# Patient Record
Sex: MALE | Race: WHITE | NOT HISPANIC OR LATINO | Employment: OTHER | URBAN - NONMETROPOLITAN AREA
[De-identification: names, ages, dates, MRNs, and addresses within clinical notes are randomized per-mention and may not be internally consistent; named-entity substitution may affect disease eponyms.]

---

## 2021-09-16 DIAGNOSIS — R07.9 CHEST PAIN, UNSPECIFIED TYPE: Primary | ICD-10-CM

## 2022-01-06 NOTE — H&P
Cardiology History and Physical Note        Patient Name: Regan Salas  Age/Sex: 66 y.o. male  : 1955  MRN: [id number]    Date of Admission : 2022    Primary care Physician: Franco Odell MD    Reason for Admission: Chest pain exercise Cardiolite stress test      Subjective:       Chief Complaint: Chest pain    History of Present Illness:  Regan Salas is a 66 y.o. male      with a height of 5 feet 10 inches weight of 194 pounds with a body mass index of 30 with a past medical history significant for atherosclerotic coronary artery disease, status post PTCA and stenting of the right coronary artery with deployment of a 3 mm x 16 mm Taxus stent done at [facility] with last coronary angiogram done in 2016 which had revealed sustained patency in the right coronary artery site of previous angioplasty and stenting with 40 to 50% stenosis in the first diagonal branch towards the ostium, luminal irregularity in the left anterior descending artery, 40 to 50% stenosis in the first obtuse marginal branch treated medically, baseline resting electrocardiogram with a right bundle branch block, arterial hypertension, hypertensive heart disease, concentric left ventricular hypertrophy with diastolic dysfunction, hyperlipidemia, nonrheumatic mild mitral and nonrheumatic mild tricuspid regurgitation and benign prostatic hypertrophy evaluation by Dr. Xiong.    Patient had recently been on a cruise to [place].  Patient presents for a follow-up evaluation.  Patient was evaluated by Dr. Xiong.  Patient complaining of having atypical symptoms of discomfort.  Patient is unable to describe if the discomfort is similar in quality as the one which she had experienced prior to the stent placement.    Patient has been active.  Patient on questioning denies any symptoms of palpitation.  Patient denies any lower extremity edema.  Patient denies any symptoms of lightheaded dizziness near  syncope.    Patient on further questioning denies any symptoms of easy bruising.    Patient resting heart rate is 71 bpm with a blood pressure 120/72  respiration of 18.  Patient resting electrocardiogram done revealed sinus rhythm with a right bundle branch block.    Patient 10 point review of system except for what stated in the history of present Xience is negative.    Concurrent  Medical History:  1.  Atypical chest pain.  2.  Baseline resting electrocardiogram with a right bundle branch block.  3.  Atherosclerotic coronary artery disease.  4.  Last coronary angiogram done in October 2016 had revealed:  A.  Sustained patency in the right coronary artery, site of previous angioplasty and stenting.  B.  First diagonal branch towards the ostium with 40% to 50% stenosis with evidence of good DELMI-3 flow.  C.  Luminal irregularity in the left anterior descending artery.  D.  First obtuse marginal branch with 40% to 50% stenosis.  5.   PTCA and stenting of the right coronary artery with deployment of a 3 mm x 16 mm Taxus stent done in July 2007.  6.   Arterial hypertension.  7.   Hypertensive heart disease with preserved left ventricular systolic function with an ejection fraction of 55% with diastolic dysfunction.  8.  Nonrheumatic mild mitral and nonrheumatic mild tricuspid regurgitation.  9.  Hyperlipidemia.  10.  Benign prostatic hypertrophy with evaluation by Dr. Xiong              Past Surgical History:  1.  Coronary angiogram.  2.  Appendectomy.  3.  Cholecystectomy    SOCIAL HISTORY:  Denies any tobacco or alcohol intake. Used to be a  for the high school. Currently retired, works part time.     FAMILY HISTORY:  Significant for father who had coronary artery bypass grafting and brother who had stents.     CARDIAC RISK FACTORS:  1.   Male.  2.   Arterial hypertension.  3.   Hyperlipidemia.  4.   Family history for coronary artery disease.        Allergies:  Not on File    Home  Medication::  Prior to Admission medications    Not on File   1.  Aspirin 81 mg once a day.  2.  Plavix 75 mg once a day.  3.  Lisinopril 10 mg daily.  4.  Pravastatin 40 mg at bedtime      Review of Systems   Constitutional: Positive for fatigue. Negative for chills, fever and unexpected weight change.   HENT: Negative for hearing loss and nosebleeds.    Eyes: Negative for visual disturbance.   Respiratory: Positive for chest tightness. Negative for cough, shortness of breath and wheezing.    Cardiovascular: Negative for chest pain, palpitations and leg swelling.   Gastrointestinal: Negative for abdominal pain, blood in stool, constipation, diarrhea, nausea and vomiting.   Endocrine: Negative for cold intolerance, heat intolerance, polydipsia, polyphagia and polyuria.   Genitourinary: Negative for hematuria.   Musculoskeletal: Negative for joint swelling, myalgias and neck pain.   Skin: Negative for color change, rash and wound.   Neurological: Negative for dizziness, seizures, syncope, light-headedness, numbness and headaches.   Hematological: Does not bruise/bleed easily.         Objective:     There were no vitals taken for this visit.  Blood pressure 120/72 pulse of 81 respiration of 18 oxygen saturation of 97%  Constitutional:       Appearance: Well-developed.   Eyes:      General: No scleral icterus.     Conjunctiva/sclera: Conjunctivae normal.      Pupils: Pupils are equal, round, and reactive to light.   HENT:      Head: Normocephalic and atraumatic.      Left Ear: External ear normal.      Nose: Nose normal.   Neck:      Thyroid: No thyromegaly.      Vascular: No JVD.      Trachea: No tracheal deviation.      Lymphadenopathy: No cervical adenopathy.   Pulmonary:      Breath sounds: Normal breath sounds. No stridor.   Cardiovascular:      Normal rate. Regular rhythm.   Abdominal:      General: Bowel sounds are normal.   Musculoskeletal: Normal range of motion.      Cervical back: Normal range of motion and  neck supple. Skin:     General: Skin is warm and dry.   Neurological:      Mental Status: Alert and oriented to person, place, and time.      Deep Tendon Reflexes: Reflexes are normal and symmetric.   Psychiatric:         Behavior: Behavior normal.         Thought Content: Thought content normal.         Judgment: Judgment normal.         Lab Review:           Invalid input(s): LABALBU, PROT                                    EKG:   ECG/EMG Results (last 24 hours)     ** No results found for the last 24 hours. **          Imaging:  Imaging Results (Last 24 Hours)     ** No results found for the last 24 hours. **          I personally viewed and interpreted the patient's EKG/Telemetry data.    Assessment:   1.  Atypical chest pain.  2.  Atherosclerotic coronary artery disease.  3.  PTCA and stenting of the right coronary artery done in 2007 at [facility].  4.  Arterial hypertension.  5.  Hypertensive heart disease.  6.  Concentric left ventricular hypertrophy with diastolic dysfunction with an ejection fraction of 55%.  7.  Nonrheumatic mitral and tricuspid regurgitation.  8.  Hyperlipidemia.          Plan:   1.  Atherosclerotic coronary artery disease.  Patient has history of documented coronary artery disease with PTCA and stenting of the right coronary artery done in 2007 at [facility] with deployment of a 3 mm x 16 mm Taxus drug-eluting stent.  Patient last coronary angiogram done in 2016 had revealed sustained patency in the right coronary artery with 40 to 50% stenosis in the diagonal branch towards the ostium and 40 to 50% stenosis in the first obtuse marginal branch.  Patient has been active.  Patient has symptoms of atypical chest pain.  Patient resting electrocardiogram done reveals sinus rhythm with a right bundle branch block.  Patient at the present time has been recommended to undergo exercise Cardiolite stress test.  Risk-benefit treatment option further exercise Cardiolite stress  test were discussed with the patient and an informed consent was obtained.  Risk-benefit treatment options for the exercise Cardiolite stress test were discussed with the patient and an informed consent was obtained.      Myocardial perfusion scintigraphy (MPS)  was recommended to the patient as the best non-invasive modality for the assessment of obstructive CAD.  I  did spend some time discussing the procedure, as well as risks and benefits.  I also informed the patient that the risk of nonfatal MI or major cardiac complication is about  0.02%. The patient was also informed about the risks and benefits of MPS. Patient was also provided with a handout describing the test, as well as patient instructions prior to testing.      I also discussed the results of MPS as divided into risks.The NPV of this test is as high as 98%.  I also specifically discussed the risk of cardiac death with the following percentages:    Low-risk MPS:                          0.5% per year  Mildly abnormal MPS:              2.7%  Moderately abnormal:             2.9%  Severely abnormal:                 4.2%    2.  Abnormal resting electrocardiogram.  Patient baseline resting electrocardiogram reveals sinus rhythm with a right bundle branch block.  Patient had not complained of having any symptoms of lightheaded dizziness near syncope.    3.  Arterial hypertension.  Patient blood pressure 120/72.  Patient has been counseled to decrease her salt intake and to continue with the present dose of the lisinopril 10 mg daily.    4.  Hypertensive heart disease with concentric left ventricular hypertrophy with diastolic dysfunction with nonrheumatic mitral and tricuspid regurgitation.  Clinically at the present time patient is euvolemic and not in congestive heart failure.    5.  Hyperlipidemia.  Patient has been counseled on low-fat low-cholesterol diet and to continue with the present dose of the pravastatin.    6.  Benign prostatic hypertrophy.   Patient has had evaluation by Dr. Xiong.  Patient had not complained of having any urinary disturbances.    7.  Dual antiplatelets with aspirin and Plavix.  Patient had not complained of having any episodes of any bruising or any bleeding diastasis.    The above plan of management were discussed with the patient.      Time: time spent in face-to-face evaluation of greater than 55 minutes and interacting and formulating examining and discussing the plan with the patient with 50% of greater time spent in face-to-face interaction.    Electronically signed by Demond Scott MD, 01/06/22, 7:20 AM CST.    Dictated utilizing Dragon dictation.

## 2022-01-07 ENCOUNTER — APPOINTMENT (OUTPATIENT)
Dept: NUCLEAR MEDICINE | Facility: HOSPITAL | Age: 67
End: 2022-01-07

## 2022-01-07 ENCOUNTER — HOSPITAL ENCOUNTER (OUTPATIENT)
Dept: CARDIOLOGY | Facility: HOSPITAL | Age: 67
Discharge: HOME | End: 2022-01-07

## 2022-01-21 RX ORDER — PRAVASTATIN SODIUM 40 MG
40 TABLET ORAL NIGHTLY
COMMUNITY

## 2022-01-21 RX ORDER — ASPIRIN 81 MG/1
81 TABLET, CHEWABLE ORAL DAILY
COMMUNITY

## 2022-01-21 RX ORDER — CLOPIDOGREL BISULFATE 75 MG/1
75 TABLET ORAL DAILY
COMMUNITY

## 2022-01-21 RX ORDER — LISINOPRIL 10 MG/1
10 TABLET ORAL DAILY
COMMUNITY

## 2022-01-24 ENCOUNTER — TELEPHONE (OUTPATIENT)
Dept: GENERAL RADIOLOGY | Facility: HOSPITAL | Age: 67
End: 2022-01-24

## 2022-01-24 ENCOUNTER — APPOINTMENT (OUTPATIENT)
Dept: NUCLEAR MEDICINE | Facility: HOSPITAL | Age: 67
End: 2022-01-24

## 2022-01-24 ENCOUNTER — HOSPITAL ENCOUNTER (OUTPATIENT)
Dept: CARDIOLOGY | Facility: HOSPITAL | Age: 67
Discharge: HOME | End: 2022-01-24

## 2022-02-04 ENCOUNTER — APPOINTMENT (OUTPATIENT)
Dept: CARDIOLOGY | Facility: HOSPITAL | Age: 67
End: 2022-02-04

## 2022-02-04 ENCOUNTER — APPOINTMENT (OUTPATIENT)
Dept: NUCLEAR MEDICINE | Facility: HOSPITAL | Age: 67
End: 2022-02-04

## 2022-02-09 ENCOUNTER — HOSPITAL ENCOUNTER (OUTPATIENT)
Dept: NUCLEAR MEDICINE | Facility: HOSPITAL | Age: 67
Discharge: HOME | End: 2022-02-09

## 2022-02-09 ENCOUNTER — HOSPITAL ENCOUNTER (OUTPATIENT)
Dept: CARDIOLOGY | Facility: HOSPITAL | Age: 67
Discharge: HOME | End: 2022-02-09

## 2022-02-09 DIAGNOSIS — R07.9 CHEST PAIN, UNSPECIFIED TYPE: ICD-10-CM

## 2022-02-09 DIAGNOSIS — Z98.61 HISTORY OF PTCA: ICD-10-CM

## 2022-02-09 DIAGNOSIS — I25.10 ATHEROSCLEROSIS OF NATIVE CORONARY ARTERY OF NATIVE HEART, UNSPECIFIED WHETHER ANGINA PRESENT: ICD-10-CM

## 2022-02-09 PROCEDURE — 78452 HT MUSCLE IMAGE SPECT MULT: CPT

## 2022-02-09 PROCEDURE — A9500 TC99M SESTAMIBI: HCPCS | Performed by: INTERNAL MEDICINE

## 2022-02-09 PROCEDURE — 0 TECHNETIUM SESTAMIBI: Performed by: INTERNAL MEDICINE

## 2022-02-09 PROCEDURE — 93017 CV STRESS TEST TRACING ONLY: CPT

## 2022-02-09 RX ADMIN — TECHNETIUM TC 99M SESTAMIBI 1 DOSE: 1 INJECTION INTRAVENOUS at 09:45

## 2022-02-09 RX ADMIN — TECHNETIUM TC 99M SESTAMIBI 1 DOSE: 1 INJECTION INTRAVENOUS at 08:09

## 2022-02-19 LAB
BH CV REST NUCLEAR ISOTOPE DOSE: 10.9 MCI
BH CV STRESS BP STAGE 1: NORMAL
BH CV STRESS BP STAGE 2: NORMAL
BH CV STRESS DURATION MIN STAGE 1: 3
BH CV STRESS DURATION MIN STAGE 2: 1
BH CV STRESS DURATION SEC STAGE 1: 0
BH CV STRESS DURATION SEC STAGE 2: 38
BH CV STRESS GRADE STAGE 1: 10
BH CV STRESS GRADE STAGE 2: 12
BH CV STRESS HR STAGE 1: 121
BH CV STRESS HR STAGE 2: 134
BH CV STRESS METS STAGE 1: 5
BH CV STRESS METS STAGE 2: 7.5
BH CV STRESS NUCLEAR ISOTOPE DOSE: 35.6 MCI
BH CV STRESS PROTOCOL 1: NORMAL
BH CV STRESS RECOVERY BP: NORMAL MMHG
BH CV STRESS RECOVERY HR: 84 BPM
BH CV STRESS SPEED STAGE 1: 1.7
BH CV STRESS SPEED STAGE 2: 2.5
BH CV STRESS STAGE 1: 1
BH CV STRESS STAGE 2: 2
LV EF NUC BP: 80 %
MAXIMAL PREDICTED HEART RATE: 153 BPM
PERCENT MAX PREDICTED HR: 87.58 %
STRESS BASELINE BP: NORMAL MMHG
STRESS BASELINE HR: 73 BPM
STRESS PERCENT HR: 103 %
STRESS POST ESTIMATED WORKLOAD: 7 METS
STRESS POST EXERCISE DUR MIN: 4 MIN
STRESS POST EXERCISE DUR SEC: 38 SEC
STRESS POST PEAK BP: NORMAL MMHG
STRESS POST PEAK HR: 134 BPM
STRESS TARGET HR: 130 BPM

## 2023-05-04 ENCOUNTER — PREP FOR SURGERY (OUTPATIENT)
Dept: OTHER | Facility: HOSPITAL | Age: 68
End: 2023-05-04
Payer: MEDICARE

## 2023-05-04 DIAGNOSIS — I44.1 AV BLOCK, 2ND DEGREE: ICD-10-CM

## 2023-05-04 DIAGNOSIS — I25.10 ATHEROSCLEROSIS OF NATIVE CORONARY ARTERY OF NATIVE HEART, UNSPECIFIED WHETHER ANGINA PRESENT: Primary | ICD-10-CM

## 2023-05-04 DIAGNOSIS — Z98.61 HISTORY OF PTCA: ICD-10-CM

## 2023-05-04 DIAGNOSIS — R94.31 ABNORMAL EKG: ICD-10-CM

## 2023-05-04 RX ORDER — SODIUM CHLORIDE 9 MG/ML
40 INJECTION, SOLUTION INTRAVENOUS AS NEEDED
Status: CANCELLED | OUTPATIENT
Start: 2023-05-05

## 2023-05-04 RX ORDER — SODIUM CHLORIDE 0.9 % (FLUSH) 0.9 %
10 SYRINGE (ML) INJECTION AS NEEDED
Status: CANCELLED | OUTPATIENT
Start: 2023-05-05

## 2023-05-04 RX ORDER — SODIUM CHLORIDE 0.9 % (FLUSH) 0.9 %
3 SYRINGE (ML) INJECTION EVERY 12 HOURS SCHEDULED
Status: CANCELLED | OUTPATIENT
Start: 2023-05-05

## 2023-05-04 RX ORDER — SODIUM CHLORIDE 9 MG/ML
75 INJECTION, SOLUTION INTRAVENOUS CONTINUOUS
Status: CANCELLED | OUTPATIENT
Start: 2023-05-05

## 2023-05-04 NOTE — H&P
Cardiology History and Physical Note        Patient Name: Regan Salas  Age/Sex: 67 y.o. male  : 1955  MRN: [id number]    Date of Admission : 2023    Primary care Physician: Demond Scott MD    Reason for Admission: History of atherosclerotic coronary artery disease with previous PTCA and stenting of the right coronary artery chest pain 2-1 AV block      Subjective:       Chief Complaint: Dizziness.    History of Present Illness:  Regan Salas is a 67 y.o. male      with a height of 5 feet 10 inches weight of 194 pounds with a body mass index of 30 with a past medical history significant for atherosclerotic coronary artery disease, status post PTCA and stenting of the right coronary artery with deployment of a 3 mm x 16 mm Taxus stent done at [facility] with last coronary angiogram done in 2016 which had revealed sustained patency in the right coronary artery site of previous angioplasty and stenting with 40 to 50% stenosis in the first diagonal branch towards the ostium, luminal irregularity in the left anterior descending artery, 40 to 50% stenosis in the first obtuse marginal branch treated medically, negative myocardial perfusion Lexiscan Cardiolite stress test done in 2022, resting EKG with a right bundle branch block with 2-1 block, arterial hypertension, hypertensive heart disease, concentric left ventricular hypertrophy with diastolic dysfunction, hyperlipidemia, nonrheumatic mild mitral and nonrheumatic mild tricuspid regurgitation and benign prostatic hypertrophy evaluation by Dr. Xiong.     Patient had been on a cruise to [place].  Patient presents for a follow-up evaluation.  Patient was evaluated by Dr. Xiong.  Patient complaining of having atypical symptoms of discomfort.  Patient is unable to describe if the discomfort is similar in quality as the one which she had experienced prior to the stent placement.     Patient has been active. Patient on  questioning denies any symptoms of palpitation. Patient denies any lower extremity edema.  Patient denies any symptoms of lightheaded dizziness near syncope.     Patient on further questioning denies any symptoms of easy bruising.     Patient resting EKG is sinus bradycardia with 2-1 block with right bundle branch block rate of 39 bpm with a blood pressure 120/72  respiration of 18.      Patient 10 point review of system except for what stated in the history of present Xience is negative.     Concurrent  Medical History:  1.  Atypical chest pain.  2.    Sinus bradycardia with 2-1 block with a right bundle branch block..  3.  Atherosclerotic coronary artery disease.  4.  Last coronary angiogram done in October 2016 had revealed:  A.  Sustained patency in the right coronary artery, site of previous angioplasty and stenting.  B.  First diagonal branch towards the ostium with 40% to 50% stenosis with evidence of good DELMI-3 flow.  C.  Luminal irregularity in the left anterior descending artery.  D.  First obtuse marginal branch with 40% to 50% stenosis.  5.   PTCA and stenting of the right coronary artery with deployment of a 3 mm x 16 mm Taxus stent done in July 2007.  6.   Arterial hypertension.  7.   Hypertensive heart disease with preserved left ventricular systolic function with an ejection fraction of 55% with diastolic dysfunction.  8.  Nonrheumatic mild mitral and nonrheumatic mild tricuspid regurgitation.  9.  Hyperlipidemia.  10.  Benign prostatic hypertrophy with evaluation by Dr. Xiong                    Past Surgical History:  1.  Coronary angiogram.  2.  Appendectomy.  3.  Cholecystectomy     SOCIAL HISTORY:  Denies any tobacco or alcohol intake. Used to be a  for the high school. Currently retired, works part time.     FAMILY HISTORY:  Significant for father who had coronary artery bypass grafting and brother who had stents.     CARDIAC RISK FACTORS:  1.   Male.  2.   Arterial  hypertension.  3.   Hyperlipidemia.  4.   Family history for coronary artery disease.     Allergies:  No Known Allergies    Home Medication::  Prior to Admission medications    Medication Sig Start Date End Date Taking? Authorizing Provider   aspirin 81 MG chewable tablet Chew 81 mg Daily.    Howard Russo MD   clopidogrel (PLAVIX) 75 MG tablet Take 75 mg by mouth Daily.    Howard Russo MD   lisinopril (PRINIVIL,ZESTRIL) 10 MG tablet Take 10 mg by mouth Daily.    Howard Russo MD   pravastatin (PRAVACHOL) 40 MG tablet Take 40 mg by mouth Every Night.    Howard Russo MD         Review of Systems   Constitutional: Positive for fatigue. Negative for chills, fever and unexpected weight change.   HENT: Negative for hearing loss and nosebleeds.    Eyes: Negative for visual disturbance.   Respiratory: Positive for chest tightness. Negative for cough, shortness of breath and wheezing.    Cardiovascular: Negative for chest pain, palpitations and leg swelling.   Gastrointestinal: Negative for abdominal pain, blood in stool, constipation, diarrhea, nausea and vomiting.   Endocrine: Negative for cold intolerance, heat intolerance, polydipsia, polyphagia and polyuria.   Genitourinary: Negative for hematuria.   Musculoskeletal: Negative for joint swelling, myalgias and neck pain.   Skin: Negative for color change, rash and wound.   Neurological: Positive for light-headedness. Negative for dizziness, seizures, syncope, numbness and headaches.   Hematological: Does not bruise/bleed easily.         Objective:     There were no vitals taken for this visit.    Physical Exam    Lab Review:           Invalid input(s): LABALBU, PROT                                    EKG:   ECG/EMG Results (last 24 hours)     ** No results found for the last 24 hours. **          Imaging:  Imaging Results (Last 24 Hours)     ** No results found for the last 24 hours. **          I personally viewed and interpreted the  patient's EKG/Telemetry data.    Assessment:       1.  Atypical chest pain abnormal EKG with sinus bradycardia right bundle branch block and 2 : 1 AV block with a heart rate of 39 bpm  2.  Atherosclerotic coronary artery disease.  3.  PTCA and stenting of the right coronary artery done in 2007 at [facility].  4.  Arterial hypertension.  5.  Hypertensive heart disease.  6.  Concentric left ventricular hypertrophy with diastolic dysfunction with an ejection fraction of 55%.  7.  Nonrheumatic mitral and tricuspid regurgitation.  8.  Hyperlipidemia.      Plan:   1.  Atherosclerotic coronary artery disease.  Patient has history of documented coronary artery disease with PTCA and stenting of the right coronary artery done in 2007 at [facility] with deployment of a 3 mm x 16 mm Taxus drug-eluting stent.  Patient last coronary angiogram done in 2016 had revealed sustained patency in the right coronary artery with 40 to 50% stenosis in the diagonal branch towards the ostium and 40 to 50% stenosis in the first obtuse marginal branch.  Patient has been active.  Patient has symptoms of atypical chest pain.  Patient resting electrocardiogram done revealed sinus bradycardia with 2-1 block.  Patient has been recommended dual-chamber pacemaker implantation.  Due to the patient symptoms of chest pain with previous right coronary artery stent patient has been recommended coronary angiogram prior to pacemaker implantation.  Risk-benefit treatment option for a permanent pacemaker implantation was discussed with the patient also.  Patient has documented history of atherosclerotic coronary artery disease with symptoms of substernal chest pain suggestive of crescendo unstable angina pectoris.  Patient has been explained risk-benefit treatment options for a coronary angiogram and an informed consent was obtained from the patient for the coronary angiogram.  Patient's Mallampati score was II, patient's ASA classification  was II.  Patient will undergo IV hydration and will check the renal function prior to the coronary angiogram.      2.  Abnormal resting electrocardiogram.  Patient resting electrocardiogram reveals sinus bradycardia heart rate of 39 with 2-1 block with right bundle branch block.  Patient had complained of having any symptoms of lightheaded.  Patient complains of feeling tired and fatigued.  Patient has been recommended after the coronary angiogram dual-chamber pacemaker implantation unless the patient has significant stenosis in the right coronary artery which could be contributing to the patient 2-1 block.  Patient is currently not on any AV blocking medication.    3.  Arterial hypertension.  Patient blood pressure 120/72.  Patient has been counseled to decrease her salt intake and to continue with the present dose of the lisinopril 10 mg daily.     4.  Hypertensive heart disease with concentric left ventricular hypertrophy with diastolic dysfunction with nonrheumatic mitral and tricuspid regurgitation.  Clinically at the present time patient is euvolemic and not in congestive heart failure.  Patient has been recommended to undergo a transthoracic echocardiogram to evaluate the left ventricular systolic function.     5.  Hyperlipidemia.  Patient has been counseled on low-fat low-cholesterol diet and to continue with the present dose of the pravastatin.     6.  Benign prostatic hypertrophy.  Patient has had evaluation by Dr. Xiong.  Patient had not complained of having any urinary disturbances.     7.  Dual antiplatelets with aspirin and Plavix.  Patient had not complained of having any episodes of any bruising or any bleeding diastasis.  Patient Plavix will be held in anticipation for pacemaker implantation.     The above plan of management were discussed with the patient.          Time: time spent in face-to-face evaluation of greater than 55  minutes and interacting and formulating examining and discussing the  plan with the patient with 50% of greater time spent in face-to-face interaction.    Electronically signed by Demond Scott MD, 05/04/23, 10:38 AM CDT.      Dictated utilizing Dragon dictation.

## 2023-05-05 ENCOUNTER — PREP FOR SURGERY (OUTPATIENT)
Dept: OTHER | Facility: HOSPITAL | Age: 68
End: 2023-05-05
Payer: MEDICARE

## 2023-05-05 ENCOUNTER — HOSPITAL ENCOUNTER (OUTPATIENT)
Facility: HOSPITAL | Age: 68
Setting detail: HOSPITAL OUTPATIENT SURGERY
Discharge: HOME | End: 2023-05-05
Attending: INTERNAL MEDICINE | Admitting: INTERNAL MEDICINE
Payer: MEDICARE

## 2023-05-05 VITALS
HEART RATE: 57 BPM | RESPIRATION RATE: 20 BRPM | HEIGHT: 70 IN | OXYGEN SATURATION: 95 % | BODY MASS INDEX: 28.53 KG/M2 | DIASTOLIC BLOOD PRESSURE: 72 MMHG | WEIGHT: 199.3 LBS | SYSTOLIC BLOOD PRESSURE: 149 MMHG | TEMPERATURE: 97.8 F

## 2023-05-05 DIAGNOSIS — Z98.61 HISTORY OF PTCA: ICD-10-CM

## 2023-05-05 DIAGNOSIS — I49.5 SSS (SICK SINUS SYNDROME): Primary | ICD-10-CM

## 2023-05-05 DIAGNOSIS — I25.10 ATHEROSCLEROSIS OF NATIVE CORONARY ARTERY OF NATIVE HEART, UNSPECIFIED WHETHER ANGINA PRESENT: ICD-10-CM

## 2023-05-05 DIAGNOSIS — I44.1 AV BLOCK, 2ND DEGREE: ICD-10-CM

## 2023-05-05 DIAGNOSIS — R94.31 ABNORMAL EKG: ICD-10-CM

## 2023-05-05 LAB
ANION GAP SERPL CALCULATED.3IONS-SCNC: 7 MMOL/L (ref 5–15)
BASOPHILS # BLD AUTO: 0.07 10*3/MM3 (ref 0–0.2)
BASOPHILS NFR BLD AUTO: 0.9 % (ref 0–1.5)
BUN SERPL-MCNC: 14 MG/DL (ref 8–23)
BUN/CREAT SERPL: 12.8 (ref 7–25)
CALCIUM SPEC-SCNC: 8.8 MG/DL (ref 8.6–10.5)
CHLORIDE SERPL-SCNC: 106 MMOL/L (ref 98–107)
CO2 SERPL-SCNC: 28 MMOL/L (ref 22–29)
CREAT SERPL-MCNC: 1.09 MG/DL (ref 0.76–1.27)
DEPRECATED RDW RBC AUTO: 42.9 FL (ref 37–54)
EGFRCR SERPLBLD CKD-EPI 2021: 73.9 ML/MIN/1.73
EOSINOPHIL # BLD AUTO: 0.12 10*3/MM3 (ref 0–0.4)
EOSINOPHIL NFR BLD AUTO: 1.5 % (ref 0.3–6.2)
ERYTHROCYTE [DISTWIDTH] IN BLOOD BY AUTOMATED COUNT: 12.1 % (ref 12.3–15.4)
GLUCOSE SERPL-MCNC: 95 MG/DL (ref 65–99)
HCT VFR BLD AUTO: 43.3 % (ref 37.5–51)
HGB BLD-MCNC: 14.7 G/DL (ref 13–17.7)
IMM GRANULOCYTES # BLD AUTO: 0.09 10*3/MM3 (ref 0–0.05)
IMM GRANULOCYTES NFR BLD AUTO: 1.1 % (ref 0–0.5)
INR PPP: 0.99 (ref 0.8–1.2)
LYMPHOCYTES # BLD AUTO: 1.91 10*3/MM3 (ref 0.7–3.1)
LYMPHOCYTES NFR BLD AUTO: 23.6 % (ref 19.6–45.3)
MCH RBC QN AUTO: 32.4 PG (ref 26.6–33)
MCHC RBC AUTO-ENTMCNC: 33.9 G/DL (ref 31.5–35.7)
MCV RBC AUTO: 95.4 FL (ref 79–97)
MONOCYTES # BLD AUTO: 0.61 10*3/MM3 (ref 0.1–0.9)
MONOCYTES NFR BLD AUTO: 7.5 % (ref 5–12)
NEUTROPHILS NFR BLD AUTO: 5.3 10*3/MM3 (ref 1.7–7)
NEUTROPHILS NFR BLD AUTO: 65.4 % (ref 42.7–76)
NRBC BLD AUTO-RTO: 0 /100 WBC (ref 0–0.2)
PLATELET # BLD AUTO: 214 10*3/MM3 (ref 140–450)
PMV BLD AUTO: 10.8 FL (ref 6–12)
POTASSIUM SERPL-SCNC: 4.1 MMOL/L (ref 3.5–5.2)
PROTHROMBIN TIME: 13 SECONDS (ref 11.1–15.3)
RBC # BLD AUTO: 4.54 10*6/MM3 (ref 4.14–5.8)
SODIUM SERPL-SCNC: 141 MMOL/L (ref 136–145)
WBC NRBC COR # BLD: 8.1 10*3/MM3 (ref 3.4–10.8)
WHOLE BLOOD HOLD SPECIMEN: NORMAL

## 2023-05-05 PROCEDURE — 25010000002 MIDAZOLAM PER 1 MG: Performed by: INTERNAL MEDICINE

## 2023-05-05 PROCEDURE — C1894 INTRO/SHEATH, NON-LASER: HCPCS | Performed by: INTERNAL MEDICINE

## 2023-05-05 PROCEDURE — C1769 GUIDE WIRE: HCPCS | Performed by: INTERNAL MEDICINE

## 2023-05-05 PROCEDURE — 25010000002 FENTANYL CITRATE (PF) 50 MCG/ML SOLUTION: Performed by: INTERNAL MEDICINE

## 2023-05-05 PROCEDURE — 80048 BASIC METABOLIC PNL TOTAL CA: CPT | Performed by: INTERNAL MEDICINE

## 2023-05-05 PROCEDURE — 99152 MOD SED SAME PHYS/QHP 5/>YRS: CPT | Performed by: INTERNAL MEDICINE

## 2023-05-05 PROCEDURE — C1760 CLOSURE DEV, VASC: HCPCS | Performed by: INTERNAL MEDICINE

## 2023-05-05 PROCEDURE — 93458 L HRT ARTERY/VENTRICLE ANGIO: CPT | Performed by: INTERNAL MEDICINE

## 2023-05-05 PROCEDURE — 85610 PROTHROMBIN TIME: CPT | Performed by: INTERNAL MEDICINE

## 2023-05-05 PROCEDURE — 85025 COMPLETE CBC W/AUTO DIFF WBC: CPT | Performed by: INTERNAL MEDICINE

## 2023-05-05 RX ORDER — SODIUM CHLORIDE 9 MG/ML
40 INJECTION, SOLUTION INTRAVENOUS AS NEEDED
Status: DISCONTINUED | OUTPATIENT
Start: 2023-05-05 | End: 2023-05-05 | Stop reason: HOSPADM

## 2023-05-05 RX ORDER — MELOXICAM 15 MG/1
1 TABLET ORAL DAILY
COMMUNITY
Start: 2023-04-18

## 2023-05-05 RX ORDER — SODIUM CHLORIDE 9 MG/ML
75 INJECTION, SOLUTION INTRAVENOUS CONTINUOUS
Status: DISCONTINUED | OUTPATIENT
Start: 2023-05-05 | End: 2023-05-05 | Stop reason: HOSPADM

## 2023-05-05 RX ORDER — LIDOCAINE HYDROCHLORIDE 20 MG/ML
INJECTION, SOLUTION INFILTRATION; PERINEURAL
Status: DISCONTINUED | OUTPATIENT
Start: 2023-05-05 | End: 2023-05-05 | Stop reason: HOSPADM

## 2023-05-05 RX ORDER — ACETAMINOPHEN 325 MG/1
650 TABLET ORAL EVERY 4 HOURS PRN
Status: DISCONTINUED | OUTPATIENT
Start: 2023-05-05 | End: 2023-05-05 | Stop reason: HOSPADM

## 2023-05-05 RX ORDER — FENTANYL CITRATE 50 UG/ML
INJECTION, SOLUTION INTRAMUSCULAR; INTRAVENOUS
Status: DISCONTINUED | OUTPATIENT
Start: 2023-05-05 | End: 2023-05-05 | Stop reason: HOSPADM

## 2023-05-05 RX ORDER — SODIUM CHLORIDE 0.9 % (FLUSH) 0.9 %
3 SYRINGE (ML) INJECTION EVERY 12 HOURS SCHEDULED
Status: DISCONTINUED | OUTPATIENT
Start: 2023-05-05 | End: 2023-05-05 | Stop reason: HOSPADM

## 2023-05-05 RX ORDER — SODIUM CHLORIDE 0.9 % (FLUSH) 0.9 %
10 SYRINGE (ML) INJECTION AS NEEDED
Status: DISCONTINUED | OUTPATIENT
Start: 2023-05-05 | End: 2023-05-05 | Stop reason: HOSPADM

## 2023-05-05 RX ORDER — SODIUM CHLORIDE 9 MG/ML
100 INJECTION, SOLUTION INTRAVENOUS CONTINUOUS
Status: DISCONTINUED | OUTPATIENT
Start: 2023-05-05 | End: 2023-05-05 | Stop reason: HOSPADM

## 2023-05-05 RX ORDER — MIDAZOLAM HYDROCHLORIDE 1 MG/ML
INJECTION INTRAMUSCULAR; INTRAVENOUS
Status: DISCONTINUED | OUTPATIENT
Start: 2023-05-05 | End: 2023-05-05 | Stop reason: HOSPADM

## 2023-05-05 RX ADMIN — SODIUM CHLORIDE 75 ML/HR: 9 INJECTION, SOLUTION INTRAVENOUS at 07:23

## 2023-05-05 NOTE — H&P
Cardiology History and Physical Note        Patient Name: Regan Salas  Age/Sex: 67 y.o. male  : 1955  MRN: [id number]    Date of Admission : 2023    Primary care Physician: Demond Scott MD    Reason for Admission: History of atherosclerotic coronary artery disease with previous PTCA and stenting of the right coronary artery chest pain 2-1 AV block      Subjective:       Chief Complaint: Dizziness.    History of Present Illness:  Regan Salas is a 67 y.o. male      with a height of 5 feet 10 inches weight of 194 pounds with a body mass index of 30 with a past medical history significant for atherosclerotic coronary artery disease, status post PTCA and stenting of the right coronary artery with deployment of a 3 mm x 16 mm Taxus stent done at [facility] with last coronary angiogram done in 2016 which had revealed sustained patency in the right coronary artery site of previous angioplasty and stenting with 40 to 50% stenosis in the first diagonal branch towards the ostium, luminal irregularity in the left anterior descending artery, 40 to 50% stenosis in the first obtuse marginal branch treated medically, negative myocardial perfusion Lexiscan Cardiolite stress test done in 2022, resting EKG with a right bundle branch block with 2-1 block, arterial hypertension, hypertensive heart disease, concentric left ventricular hypertrophy with diastolic dysfunction, hyperlipidemia, nonrheumatic mild mitral and nonrheumatic mild tricuspid regurgitation and benign prostatic hypertrophy evaluation by Dr. Xiong.     Patient had been on a cruise to [place].  Patient presents for a follow-up evaluation.  Patient was evaluated by Dr. Xiong.  Patient complaining of having atypical symptoms of discomfort.  Patient is unable to describe if the discomfort is similar in quality as the one which she had experienced prior to the stent placement.     Patient has been active. Patient on  questioning denies any symptoms of palpitation. Patient denies any lower extremity edema.  Patient denies any symptoms of lightheaded dizziness near syncope.     Patient on further questioning denies any symptoms of easy bruising.     Patient resting EKG is sinus bradycardia with 2-1 block with right bundle branch block rate of 39 bpm with a blood pressure 120/72  respiration of 18.      Patient 10 point review of system except for what stated in the history of present Xience is negative.     Concurrent  Medical History:  1.  Atypical chest pain.  2.    Sinus bradycardia with 2-1 block with a right bundle branch block..  3.  Atherosclerotic coronary artery disease.  4.  Last coronary angiogram done in October 2016 had revealed:  A.  Sustained patency in the right coronary artery, site of previous angioplasty and stenting.  B.  First diagonal branch towards the ostium with 40% to 50% stenosis with evidence of good DELMI-3 flow.  C.  Luminal irregularity in the left anterior descending artery.  D.  First obtuse marginal branch with 40% to 50% stenosis.  5.   PTCA and stenting of the right coronary artery with deployment of a 3 mm x 16 mm Taxus stent done in July 2007.  6.   Arterial hypertension.  7.   Hypertensive heart disease with preserved left ventricular systolic function with an ejection fraction of 55% with diastolic dysfunction.  8.  Nonrheumatic mild mitral and nonrheumatic mild tricuspid regurgitation.  9.  Hyperlipidemia.  10.  Benign prostatic hypertrophy with evaluation by Dr. Xiong                    Past Surgical History:  1.  Coronary angiogram.  2.  Appendectomy.  3.  Cholecystectomy     SOCIAL HISTORY:  Denies any tobacco or alcohol intake. Used to be a  for the high school. Currently retired, works part time.     FAMILY HISTORY:  Significant for father who had coronary artery bypass grafting and brother who had stents.     CARDIAC RISK FACTORS:  1.   Male.  2.   Arterial  "hypertension.  3.   Hyperlipidemia.  4.   Family history for coronary artery disease.     Allergies:  No Known Allergies    Home Medication::  Prior to Admission medications    Medication Sig Start Date End Date Taking? Authorizing Provider   aspirin 81 MG chewable tablet Chew 81 mg Daily.    Howard Russo MD   clopidogrel (PLAVIX) 75 MG tablet Take 75 mg by mouth Daily.    Howard Russo MD   lisinopril (PRINIVIL,ZESTRIL) 10 MG tablet Take 10 mg by mouth Daily.    Howard Russo MD   pravastatin (PRAVACHOL) 40 MG tablet Take 40 mg by mouth Every Night.    Howard Russo MD         Review of Systems   Constitutional: Positive for fatigue. Negative for chills, fever and unexpected weight change.   HENT: Negative for hearing loss and nosebleeds.    Eyes: Negative for visual disturbance.   Respiratory: Positive for chest tightness. Negative for cough, shortness of breath and wheezing.    Cardiovascular: Negative for chest pain, palpitations and leg swelling.   Gastrointestinal: Negative for abdominal pain, blood in stool, constipation, diarrhea, nausea and vomiting.   Endocrine: Negative for cold intolerance, heat intolerance, polydipsia, polyphagia and polyuria.   Genitourinary: Negative for hematuria.   Musculoskeletal: Negative for joint swelling, myalgias and neck pain.   Skin: Negative for color change, rash and wound.   Neurological: Positive for light-headedness. Negative for dizziness, seizures, syncope, numbness and headaches.   Hematological: Does not bruise/bleed easily.         Objective:     /67   Pulse (!) 36   Temp 97.8 °F (36.6 °C) (Temporal)   Resp 18   Ht 177.8 cm (70\")   Wt 90.4 kg (199 lb 4.7 oz)   SpO2 97%   BMI 28.60 kg/m²     Physical Exam    Lab Review:     Results from last 7 days   Lab Units 05/05/23  0728   SODIUM mmol/L 141   POTASSIUM mmol/L 4.1   CHLORIDE mmol/L 106   CO2 mmol/L 28.0   BUN mg/dL 14   CREATININE mg/dL 1.09   CALCIUM mg/dL 8.8 "   GLUCOSE mg/dL 95             Results from last 7 days   Lab Units 05/05/23  0728   WBC 10*3/mm3 8.10   HEMOGLOBIN g/dL 14.7   HEMATOCRIT % 43.3   PLATELETS 10*3/mm3 214     Results from last 7 days   Lab Units 05/05/23  0728   INR  0.99                       EKG:   ECG/EMG Results (last 24 hours)     ** No results found for the last 24 hours. **          Imaging:  Imaging Results (Last 24 Hours)     ** No results found for the last 24 hours. **          I personally viewed and interpreted the patient's EKG/Telemetry data.    Assessment:       1.  Atypical chest pain abnormal EKG with sinus bradycardia right bundle branch block and 2 : 1 AV block with a heart rate of 39 bpm  2.  Atherosclerotic coronary artery disease.  3.  PTCA and stenting of the right coronary artery done in 2007 at [facility].  4.  Arterial hypertension.  5.  Hypertensive heart disease.  6.  Concentric left ventricular hypertrophy with diastolic dysfunction with an ejection fraction of 55%.  7.  Nonrheumatic mitral and tricuspid regurgitation.  8.  Hyperlipidemia.      Plan:   1.  Atherosclerotic coronary artery disease.  Patient has history of documented coronary artery disease with PTCA and stenting of the right coronary artery done in 2007 at [facility] with deployment of a 3 mm x 16 mm Taxus drug-eluting stent.  Patient last coronary angiogram done in 2016 had revealed sustained patency in the right coronary artery with 40 to 50% stenosis in the diagonal branch towards the ostium and 40 to 50% stenosis in the first obtuse marginal branch.  Patient has been active.  Patient has symptoms of atypical chest pain.  Patient resting electrocardiogram done revealed sinus bradycardia with 2-1 block.  Patient has been recommended dual-chamber pacemaker implantation.  Due to the patient symptoms of chest pain with previous right coronary artery stent patient has been recommended coronary angiogram prior to pacemaker implantation.   Risk-benefit treatment option for a permanent pacemaker implantation was discussed with the patient also.  Patient has documented history of atherosclerotic coronary artery disease with symptoms of substernal chest pain suggestive of crescendo unstable angina pectoris.  Patient has been explained risk-benefit treatment options for a coronary angiogram and an informed consent was obtained from the patient for the coronary angiogram.  Patient's Mallampati score was II, patient's ASA classification was II.  Patient will undergo IV hydration and will check the renal function prior to the coronary angiogram.      2.  Abnormal resting electrocardiogram.  Patient resting electrocardiogram reveals sinus bradycardia heart rate of 39 with 2-1 block with right bundle branch block.  Patient had complained of having any symptoms of lightheaded.  Patient complains of feeling tired and fatigued.  Patient has been recommended after the coronary angiogram dual-chamber pacemaker implantation unless the patient has significant stenosis in the right coronary artery which could be contributing to the patient 2-1 block.  Patient is currently not on any AV blocking medication.    3.  Arterial hypertension.  Patient blood pressure 120/72.  Patient has been counseled to decrease her salt intake and to continue with the present dose of the lisinopril 10 mg daily.     4.  Hypertensive heart disease with concentric left ventricular hypertrophy with diastolic dysfunction with nonrheumatic mitral and tricuspid regurgitation.  Clinically at the present time patient is euvolemic and not in congestive heart failure.  Patient has been recommended to undergo a transthoracic echocardiogram to evaluate the left ventricular systolic function.     5.  Hyperlipidemia.  Patient has been counseled on low-fat low-cholesterol diet and to continue with the present dose of the pravastatin.     6.  Benign prostatic hypertrophy.  Patient has had evaluation by   [facility].  Patient had not complained of having any urinary disturbances.     7.  Dual antiplatelets with aspirin and Plavix.  Patient had not complained of having any episodes of any bruising or any bleeding diastasis.  Patient Plavix will be held in anticipation for pacemaker implantation.     The above plan of management were discussed with the patient.          Time: time spent in face-to-face evaluation of greater than 55  minutes and interacting and formulating examining and discussing the plan with the patient with 50% of greater time spent in face-to-face interaction.    Electronically signed by Demond Scott MD, 05/05/23, 10:38 AM CDT.      Dictated utilizing Dragon dictation.

## 2023-05-05 NOTE — Clinical Note
A 4 fr sheath was  inserted using micropuncture technique into the right femoral artery. Sheath insertion not delayed.

## 2023-05-05 NOTE — PROGRESS NOTES
Patient underwent coronary angiogram.  Patient coronary angiogram did not reveal of any evidence of any obstructive epicardial coronary artery disease with sustained patency in the right coronary artery.  Patient had minimal plaquing in the first obtuse marginal branch with the dominant circumflex artery.    Patient had preserved left ventricular systolic function.    Patient continued to remain in 2-1 block.  Patient has been recommended dual-chamber pacemaker implantation to be done on Monday.  Patient arterial sheath was pulled and closed with an Angio-Seal closure device and patient was transferred to the floor in stable condition.

## 2023-05-08 ENCOUNTER — DOCUMENTATION (OUTPATIENT)
Dept: CARDIAC REHAB | Facility: HOSPITAL | Age: 68
End: 2023-05-08
Payer: MEDICARE

## 2023-05-08 ENCOUNTER — HOSPITAL ENCOUNTER (OUTPATIENT)
Facility: HOSPITAL | Age: 68
Setting detail: HOSPITAL OUTPATIENT SURGERY
Discharge: HOME | End: 2023-05-08
Attending: INTERNAL MEDICINE | Admitting: INTERNAL MEDICINE
Payer: MEDICARE

## 2023-05-08 VITALS
HEIGHT: 70 IN | OXYGEN SATURATION: 92 % | RESPIRATION RATE: 20 BRPM | DIASTOLIC BLOOD PRESSURE: 59 MMHG | SYSTOLIC BLOOD PRESSURE: 123 MMHG | BODY MASS INDEX: 28.41 KG/M2 | WEIGHT: 198.41 LBS | TEMPERATURE: 98.6 F | HEART RATE: 70 BPM

## 2023-05-08 DIAGNOSIS — I49.5 SSS (SICK SINUS SYNDROME): ICD-10-CM

## 2023-05-08 DIAGNOSIS — I44.1 AV BLOCK, 2ND DEGREE: ICD-10-CM

## 2023-05-08 LAB
ALBUMIN SERPL-MCNC: 3.8 G/DL (ref 3.5–5.2)
ALBUMIN/GLOB SERPL: 1.2 G/DL
ALP SERPL-CCNC: 83 U/L (ref 39–117)
ALT SERPL W P-5'-P-CCNC: 13 U/L (ref 1–41)
ANION GAP SERPL CALCULATED.3IONS-SCNC: 6 MMOL/L (ref 5–15)
AST SERPL-CCNC: 14 U/L (ref 1–40)
BASOPHILS # BLD AUTO: 0.06 10*3/MM3 (ref 0–0.2)
BASOPHILS NFR BLD AUTO: 0.7 % (ref 0–1.5)
BILIRUB SERPL-MCNC: 0.6 MG/DL (ref 0–1.2)
BUN SERPL-MCNC: 15 MG/DL (ref 8–23)
BUN/CREAT SERPL: 14.4 (ref 7–25)
CALCIUM SPEC-SCNC: 9 MG/DL (ref 8.6–10.5)
CHLORIDE SERPL-SCNC: 107 MMOL/L (ref 98–107)
CO2 SERPL-SCNC: 26 MMOL/L (ref 22–29)
CREAT SERPL-MCNC: 1.04 MG/DL (ref 0.76–1.27)
DEPRECATED RDW RBC AUTO: 43.6 FL (ref 37–54)
EGFRCR SERPLBLD CKD-EPI 2021: 78.2 ML/MIN/1.73
EOSINOPHIL # BLD AUTO: 0.12 10*3/MM3 (ref 0–0.4)
EOSINOPHIL NFR BLD AUTO: 1.3 % (ref 0.3–6.2)
ERYTHROCYTE [DISTWIDTH] IN BLOOD BY AUTOMATED COUNT: 12.3 % (ref 12.3–15.4)
GLOBULIN UR ELPH-MCNC: 3.2 GM/DL
GLUCOSE SERPL-MCNC: 97 MG/DL (ref 65–99)
HCT VFR BLD AUTO: 44.7 % (ref 37.5–51)
HGB BLD-MCNC: 15.1 G/DL (ref 13–17.7)
IMM GRANULOCYTES # BLD AUTO: 0.07 10*3/MM3 (ref 0–0.05)
IMM GRANULOCYTES NFR BLD AUTO: 0.8 % (ref 0–0.5)
INR PPP: 0.97 (ref 0.8–1.2)
LYMPHOCYTES # BLD AUTO: 2 10*3/MM3 (ref 0.7–3.1)
LYMPHOCYTES NFR BLD AUTO: 22 % (ref 19.6–45.3)
MCH RBC QN AUTO: 32.3 PG (ref 26.6–33)
MCHC RBC AUTO-ENTMCNC: 33.8 G/DL (ref 31.5–35.7)
MCV RBC AUTO: 95.7 FL (ref 79–97)
MONOCYTES # BLD AUTO: 0.75 10*3/MM3 (ref 0.1–0.9)
MONOCYTES NFR BLD AUTO: 8.3 % (ref 5–12)
NEUTROPHILS NFR BLD AUTO: 6.09 10*3/MM3 (ref 1.7–7)
NEUTROPHILS NFR BLD AUTO: 66.9 % (ref 42.7–76)
NRBC BLD AUTO-RTO: 0 /100 WBC (ref 0–0.2)
PLATELET # BLD AUTO: 199 10*3/MM3 (ref 140–450)
PMV BLD AUTO: 11.3 FL (ref 6–12)
POTASSIUM SERPL-SCNC: 3.9 MMOL/L (ref 3.5–5.2)
PROT SERPL-MCNC: 7 G/DL (ref 6–8.5)
PROTHROMBIN TIME: 12.8 SECONDS (ref 11.1–15.3)
RBC # BLD AUTO: 4.67 10*6/MM3 (ref 4.14–5.8)
SODIUM SERPL-SCNC: 139 MMOL/L (ref 136–145)
WBC NRBC COR # BLD: 9.09 10*3/MM3 (ref 3.4–10.8)

## 2023-05-08 PROCEDURE — C1785 PMKR, DUAL, RATE-RESP: HCPCS | Performed by: INTERNAL MEDICINE

## 2023-05-08 PROCEDURE — 33208 INSRT HEART PM ATRIAL & VENT: CPT | Performed by: INTERNAL MEDICINE

## 2023-05-08 PROCEDURE — 85610 PROTHROMBIN TIME: CPT | Performed by: INTERNAL MEDICINE

## 2023-05-08 PROCEDURE — 25510000001 IOPAMIDOL PER 1 ML: Performed by: INTERNAL MEDICINE

## 2023-05-08 PROCEDURE — 85025 COMPLETE CBC W/AUTO DIFF WBC: CPT | Performed by: INTERNAL MEDICINE

## 2023-05-08 PROCEDURE — C1898 LEAD, PMKR, OTHER THAN TRANS: HCPCS | Performed by: INTERNAL MEDICINE

## 2023-05-08 PROCEDURE — 25010000002 GENTAMICIN PER 80 MG: Performed by: INTERNAL MEDICINE

## 2023-05-08 PROCEDURE — 80053 COMPREHEN METABOLIC PANEL: CPT | Performed by: INTERNAL MEDICINE

## 2023-05-08 PROCEDURE — 25010000002 MIDAZOLAM PER 1 MG: Performed by: INTERNAL MEDICINE

## 2023-05-08 PROCEDURE — C1894 INTRO/SHEATH, NON-LASER: HCPCS | Performed by: INTERNAL MEDICINE

## 2023-05-08 PROCEDURE — 25010000002 CEFAZOLIN PER 500 MG: Performed by: INTERNAL MEDICINE

## 2023-05-08 PROCEDURE — 25010000002 FENTANYL CITRATE (PF) 50 MCG/ML SOLUTION: Performed by: INTERNAL MEDICINE

## 2023-05-08 DEVICE — GEN PM ASSURITY MRI DR RF PM2272: Type: IMPLANTABLE DEVICE | Status: FUNCTIONAL

## 2023-05-08 DEVICE — LD PM TENDRIL STS 6F46CM 2088TC46: Type: IMPLANTABLE DEVICE | Site: HEART | Status: FUNCTIONAL

## 2023-05-08 DEVICE — LD PM TENDRIL STS 6F52CM 2088TC52: Type: IMPLANTABLE DEVICE | Site: HEART | Status: FUNCTIONAL

## 2023-05-08 RX ORDER — BUPIVACAINE HCL/0.9 % NACL/PF 0.1 %
2 PLASTIC BAG, INJECTION (ML) EPIDURAL ONCE
Status: COMPLETED | OUTPATIENT
Start: 2023-05-08 | End: 2023-05-08

## 2023-05-08 RX ORDER — CEPHALEXIN 500 MG/1
500 CAPSULE ORAL 3 TIMES DAILY
Qty: 30 CAPSULE | Refills: 0 | Status: SHIPPED | OUTPATIENT
Start: 2023-05-08

## 2023-05-08 RX ORDER — SODIUM CHLORIDE 9 MG/ML
50 INJECTION, SOLUTION INTRAVENOUS CONTINUOUS
Status: DISCONTINUED | OUTPATIENT
Start: 2023-05-08 | End: 2023-05-08 | Stop reason: HOSPADM

## 2023-05-08 RX ORDER — LIDOCAINE HYDROCHLORIDE 20 MG/ML
INJECTION, SOLUTION INFILTRATION; PERINEURAL
Status: DISCONTINUED | OUTPATIENT
Start: 2023-05-08 | End: 2023-05-08 | Stop reason: HOSPADM

## 2023-05-08 RX ORDER — CLOPIDOGREL BISULFATE 75 MG/1
75 TABLET ORAL DAILY
COMMUNITY
End: 2023-05-08 | Stop reason: HOSPADM

## 2023-05-08 RX ORDER — FENTANYL CITRATE 50 UG/ML
INJECTION, SOLUTION INTRAMUSCULAR; INTRAVENOUS
Status: DISCONTINUED | OUTPATIENT
Start: 2023-05-08 | End: 2023-05-08 | Stop reason: HOSPADM

## 2023-05-08 RX ORDER — MIDAZOLAM HYDROCHLORIDE 1 MG/ML
INJECTION INTRAMUSCULAR; INTRAVENOUS
Status: DISCONTINUED | OUTPATIENT
Start: 2023-05-08 | End: 2023-05-08 | Stop reason: HOSPADM

## 2023-05-08 RX ADMIN — Medication 2 G: at 10:02

## 2023-05-08 RX ADMIN — SODIUM CHLORIDE 50 ML/HR: 9 INJECTION, SOLUTION INTRAVENOUS at 06:59

## 2023-05-08 RX ADMIN — GENTAMICIN SULFATE 60 MG: 40 INJECTION, SOLUTION INTRAMUSCULAR; INTRAVENOUS at 10:36

## 2023-05-08 NOTE — H&P
Cardiology History and Physical Note        Patient Name: Regan Salas  Age/Sex: 67 y.o. male  : 1955  MRN: [id number]    Date of Admission : 2023    Primary care Physician: Demond Scott MD    Reason for Admission:  2-1 AV block sick sinus syndrome dual-chamber pacemaker implantation      Subjective:       Chief Complaint: Dizziness.    History of Present Illness:  Regan Salas is a 67 y.o. male      with a height of 5 feet 10 inches weight of 194 pounds with a body mass index of 30 with a past medical history significant for atherosclerotic coronary artery disease, status post PTCA and stenting of the right coronary artery with deployment of a 3 mm x 16 mm Taxus stent done at [facility] with last coronary angiogram done in May 5, 2022 which had revealed sustained patency in the right coronary artery site of previous angioplasty and stenting with 30% stenosis in the first diagonal branch towards the ostium, luminal irregularity in the left anterior descending artery, 20 to 30% stenosis in the first obtuse marginal branch treated medically, negative myocardial perfusion Lexiscan Cardiolite stress test done in 2022, resting EKG with a right bundle branch block with 2-1 block, arterial hypertension, hypertensive heart disease, concentric left ventricular hypertrophy with diastolic dysfunction, hyperlipidemia, nonrheumatic mild mitral and nonrheumatic mild tricuspid regurgitation and benign prostatic hypertrophy evaluation by Dr. Xiong.     Patient had been on a cruise to [place].  Patient presents for a follow-up evaluation.  Patient was evaluated by Dr. Xiong.  Patient complaining of having atypical symptoms of discomfort.  Patient is unable to describe if the discomfort is similar in quality as the one which she had experienced prior to the stent placement.     Patient has been active. Patient on questioning denies any symptoms of palpitation. Patient denies any lower  extremity edema.  Patient denies any symptoms of lightheaded dizziness near syncope.     Patient on further questioning denies any symptoms of easy bruising.    Patient had undergone coronary angiogram which she had revealed sustained patency in the right coronary artery.  Patient denies any tick bite.  Patient is currently not on any AV blocking medication.  Patient has been recommended a dual-chamber pacemaker implantation.  Risk-benefit treatment option for permanent pacemaker implantation were discussed with the patient and an informed consent was obtained.     Patient resting EKG is sinus bradycardia with 2-1 block with right bundle branch block rate of 39 bpm with a blood pressure 120/72  respiration of 18.      Patient 10 point review of system except for what stated in the history of present illness is negative.     Concurrent  Medical History:  1.  Lightheaded dizziness.  2.  Resting electrocardiogram with sinus bradycardia with 2-1 block with a right bundle branch block.  3.  Atherosclerotic coronary artery disease.  4.  Coronary angiogram done on May 5, 2023 revealed:  A.  Sustained patency in the proximal nondominant right coronary artery site of previous angioplasty and stenting with evidence of good DELMI-3 flow.  B.  Minimal plaquing in the first obtuse marginal branch and left anterior descending artery.  C.  Preserved left ventricular systolic function with an ejection fraction of 55%.  5.   PTCA and stenting of the right coronary artery with deployment of a 3 mm x 16 mm Taxus stent done in July 2007.  6.   Arterial hypertension.  7.   Hypertensive heart disease with preserved left ventricular systolic function with an ejection fraction of 55% with diastolic dysfunction.  8.  Nonrheumatic mild mitral and nonrheumatic mild tricuspid regurgitation.  9.  Hyperlipidemia.  10.  Benign prostatic hypertrophy with evaluation by Dr. Xiong                    Past Surgical History:  1.  Coronary  angiogram.  2.  Appendectomy.  3.  Cholecystectomy     SOCIAL HISTORY:  Denies any tobacco or alcohol intake. Used to be a  for the high school. Currently retired, works part time.     FAMILY HISTORY:  Significant for father who had coronary artery bypass grafting and brother who had stents.     CARDIAC RISK FACTORS:  1.   Male.  2.   Arterial hypertension.  3.   Hyperlipidemia.  4.   Family history for coronary artery disease.     Allergies:  No Known Allergies    Home Medication::  Prior to Admission medications    Medication Sig Start Date End Date Taking? Authorizing Provider   aspirin 81 MG chewable tablet Chew 81 mg Daily.    Howard Russo MD   clopidogrel (PLAVIX) 75 MG tablet Take 75 mg by mouth Daily.    Howard Russo MD   lisinopril (PRINIVIL,ZESTRIL) 10 MG tablet Take 10 mg by mouth Daily.    Howard Rsuso MD   pravastatin (PRAVACHOL) 40 MG tablet Take 40 mg by mouth Every Night.    Howard Russo MD         Review of Systems   Constitutional: Positive for fatigue. Negative for chills, fever and unexpected weight change.   HENT: Negative for hearing loss and nosebleeds.    Eyes: Negative for visual disturbance.   Respiratory: Positive for chest tightness. Negative for cough, shortness of breath and wheezing.    Cardiovascular: Negative for chest pain, palpitations and leg swelling.   Gastrointestinal: Negative for abdominal pain, blood in stool, constipation, diarrhea, nausea and vomiting.   Endocrine: Negative for cold intolerance, heat intolerance, polydipsia, polyphagia and polyuria.   Genitourinary: Negative for hematuria.   Musculoskeletal: Negative for joint swelling, myalgias and neck pain.   Skin: Negative for color change, rash and wound.   Neurological: Positive for light-headedness. Negative for dizziness, seizures, syncope, numbness and headaches.   Hematological: Does not bruise/bleed easily.         Objective:     /70 (BP Location: Left arm,  "Patient Position: Lying)   Pulse 56   Temp 97 °F (36.1 °C) (Temporal)   Resp 20   Ht 177.8 cm (70\")   Wt 90 kg (198 lb 6.6 oz)   SpO2 96%   BMI 28.47 kg/m²     Constitutional:       Appearance: Well-developed.   Eyes:      General: No scleral icterus.     Conjunctiva/sclera: Conjunctivae normal.      Pupils: Pupils are equal, round, and reactive to light.   HENT:      Head: Normocephalic and atraumatic.      Left Ear: External ear normal.      Nose: Nose normal.   Neck:      Thyroid: No thyromegaly.      Vascular: No JVD.      Trachea: No tracheal deviation.      Lymphadenopathy: No cervical adenopathy.   Pulmonary:      Breath sounds: Normal breath sounds. No stridor.   Cardiovascular:      Normal rate. Regular rhythm.   Abdominal:      General: Bowel sounds are normal.   Musculoskeletal: Normal range of motion.      Cervical back: Normal range of motion and neck supple. Skin:     General: Skin is warm and dry.   Neurological:      Mental Status: Alert and oriented to person, place, and time.      Deep Tendon Reflexes: Reflexes are normal and symmetric.   Psychiatric:         Behavior: Behavior normal.         Thought Content: Thought content normal.         Judgment: Judgment normal.         Lab Review:     Results from last 7 days   Lab Units 05/08/23  0638   SODIUM mmol/L 139   POTASSIUM mmol/L 3.9   CHLORIDE mmol/L 107   CO2 mmol/L 26.0   BUN mg/dL 15   CREATININE mg/dL 1.04   CALCIUM mg/dL 9.0   BILIRUBIN mg/dL 0.6   ALK PHOS U/L 83   ALT (SGPT) U/L 13   AST (SGOT) U/L 14   GLUCOSE mg/dL 97             Results from last 7 days   Lab Units 05/08/23  0638   WBC 10*3/mm3 9.09   HEMOGLOBIN g/dL 15.1   HEMATOCRIT % 44.7   PLATELETS 10*3/mm3 199     Results from last 7 days   Lab Units 05/08/23  0638 05/05/23  0728   INR  0.97 0.99                       EKG:   ECG/EMG Results (last 24 hours)     ** No results found for the last 24 hours. **          Imaging:  Imaging Results (Last 24 Hours)     ** No results " found for the last 24 hours. **          I personally viewed and interpreted the patient's EKG/Telemetry data.    Assessment:   1.  2:1 block sick sinus syndrome.  2.  Atherosclerotic coronary artery disease.  3.  PTCA and stenting of the right coronary artery done in 2007 at [facility].  4.  Arterial hypertension.  5.  Hypertensive heart disease.  6.  Concentric left ventricular hypertrophy with diastolic dysfunction with an ejection fraction of 55%.  7.  Nonrheumatic mitral and tricuspid regurgitation.  8.  Hyperlipidemia.      Plan:   1.  Sick sinus syndrome 221 block sinus bradycardia with symptoms of lightheaded dizziness.  Patient has been recommended dual-chamber pacemaker implantation.  Risk-benefit treatment option for permanent pacemaker implantation were discussed with the patient and an informed consent was obtained.  Patient Mallampati score #2 patient ASA classification  #2.    2.  Atherosclerotic coronary artery disease.  Patient has history of documented coronary artery disease with PTCA and stenting of the right coronary artery done in 2007 at [facility] with deployment of a 3 mm x 16 mm Taxus drug-eluting stent.    Patient underwent coronary angiogram on May 5, 2023 which had revealed sustained patency in the right coronary artery.  Patient 2-1 block is not secondary to the right coronary artery occlusion.  Patient did not have any evidence of any obstructive disease in the left system.  Patient has been recommended medical management for the coronary artery disease.      3.  Arterial hypertension.  Patient blood pressure 120/72.  Patient has been counseled to decrease her salt intake and to continue with the present dose of the lisinopril 10 mg daily.     4.  Hypertensive heart disease with concentric left ventricular hypertrophy with diastolic dysfunction with nonrheumatic mitral and tricuspid regurgitation.  Clinically at the present time patient is euvolemic and not in  congestive heart failure.  Patient has been recommended to undergo a transthoracic echocardiogram to evaluate the left ventricular systolic function.     5.  Hyperlipidemia.  Patient has been counseled on low-fat low-cholesterol diet and to continue with the present dose of the pravastatin.     6.  Benign prostatic hypertrophy.  Patient has had evaluation by Dr. Xiong.  Patient had not complained of having any urinary disturbances.     7.  Dual antiplatelets with aspirin and Plavix.  Patient had not complained of having any episodes of any bruising or any bleeding diastasis.  Patient Plavix will be held in anticipation for pacemaker implantation.     The above plan of management were discussed with the patient.          Time: time spent in face-to-face evaluation of greater than 55  minutes and interacting and formulating examining and discussing the plan with the patient with 50% of greater time spent in face-to-face interaction.    Electronically signed by Demond Scott MD, 05/05/23, 10:38 AM CDT.      Dictated utilizing Dragon dictation.

## 2023-05-08 NOTE — PROGRESS NOTES
Patient underwent successful dual-chamber [employer] pacemaker implantation model PM 2272 serial #[id number] via right subclavian approach as the patient was left-handed with good sensing and pacing function.    Patient was transferred to same-day surgery with a left shoulder immobilizer.

## (undated) DEVICE — PK CATH LAB 60

## (undated) DEVICE — CATH DIAG EXPO M/ PK 6FR FL4/FR4 PIG 3PK

## (undated) DEVICE — PK PM 60

## (undated) DEVICE — 9135-LP 3M UNIV ELECTROSURGICA L PLATE, STD, CORDED, 40/CASE: Brand: 3M™

## (undated) DEVICE — TBG PENCL TELESCP MEGADYNE SMOKE EVAC 10FT

## (undated) DEVICE — INTRO SHEATH ART/FEM ENGAGE .038 6F12CM

## (undated) DEVICE — ANGIO-SEAL VIP VASCULAR CLOSURE DEVICE: Brand: ANGIO-SEAL

## (undated) DEVICE — ELECTRODE,RT,STRESS,FOAM,50PK: Brand: MEDLINE

## (undated) DEVICE — KT INTRO MINISTICK MAX W/GW NITNL/TUNG ECHO 4F 21G 7CM

## (undated) DEVICE — GW PERIPH GUIDERIGHT STD/J/TP PTFE/PCOAT SS 0.038IN 5X150CM

## (undated) DEVICE — MODEL BT2000 P/N 700287-012KIT CONTENTS: MANIFOLD WITH SALINE AND CONTRAST PORTS, SALINE TUBING WITH SPIKE AND HAND SYRINGE, TRANSDUCER: Brand: BT2000 AUTOMATED MANIFOLD KIT